# Patient Record
(demographics unavailable — no encounter records)

---

## 2024-10-07 NOTE — DATA REVIEWED
[FreeTextEntry1] : Labs reviewed 4/24 GUERO 1:1280 speckled DsDNA 12 RF>8, smith>8, SSA.8 plt 132 ESR 46

## 2024-10-07 NOTE — HISTORY OF PRESENT ILLNESS
[___ Month(s) Ago] : [unfilled] month(s) ago [FreeTextEntry1] : =pt plaquenil 200mg BID  =pt says her pain has completely resolved =pt had fall on R hand, and has some pain there =has not seen optho  =pt has to do PAP smear and colonoscopy; has not done mammography No fevers, rashes, swollen glands, CP, SOB, cough, abdominal pain, n/v/d, focal weakness, sensory loss

## 2024-10-07 NOTE — ASSESSMENT
[FreeTextEntry1] : 53 y/o F w polyarthralgias, polymyalgias =pain prominent in DIPs, PIPs, MCPs, wrists, toes =pain in other joints, mm, less frequent =worse w activities =past use of plaquenil =labs 4/24 GUERO 1:1280 speckled, DsDNA 12, RF>8, smith>8, SSA>8, centromere 15, ESR 46, plt 132  Pt responded to plaquenil. Will continue. Counseled to see optho for retinal toxicity screening.   Plan -Labs w serologies, inflammatory markers -plaquenil 200mg BID  RTO in 4 months

## 2024-10-07 NOTE — PHYSICAL EXAM
[General Appearance - In No Acute Distress] : in no acute distress [General Appearance - Well Nourished] : well nourished [General Appearance - Well Developed] : well developed [FreeTextEntry1] : obese [Sclera] : the sclera and conjunctiva were normal [Auscultation Breath Sounds / Voice Sounds] : lungs were clear to auscultation bilaterally [Heart Rate And Rhythm] : heart rate was normal and rhythm regular [Heart Sounds] : normal S1 and S2 [Murmurs] : no murmurs [Abdomen Soft] : soft [Abdomen Tenderness] : non-tender [Cervical Lymph Nodes Enlarged Posterior Bilaterally] : posterior cervical [Cervical Lymph Nodes Enlarged Anterior Bilaterally] : anterior cervical [Supraclavicular Lymph Nodes Enlarged Bilaterally] : supraclavicular [Musculoskeletal - Swelling] : no joint swelling seen [] : no rash [Skin Lesions] : no skin lesions [Oriented To Time, Place, And Person] : oriented to person, place, and time

## 2025-02-10 NOTE — ASSESSMENT
[FreeTextEntry1] : 52 y/o F w polyarthralgias, polymyalgias =pain prominent in DIPs, PIPs, MCPs, wrists, toes =pain in other joints, mm, less frequent =worse w activities =past use of plaquenil =labs 4/24 GUERO 1:1280 speckled, DsDNA 12, RF>8, smith>8, SSA>8, centromere 15, ESR 46, plt 132  Pt w occasional hand and foot pain. Did have high ESR, and DsDNA in last visit. can consider adding MTX?   Counseled pt needs to see optho regulary to avoid plaquenil retino-toxicity.  Plan -Labs w serologies, inflammatory markers- add MTX?  -lower hcq to patient's weight; plaquenil 200mg/400mg alternating days -optho referral -hand PT, PT  RTO in 3-4 months (earlier if add MTX)

## 2025-02-10 NOTE — HISTORY OF PRESENT ILLNESS
[___ Month(s) Ago] : [unfilled] month(s) ago [FreeTextEntry1] : =pt taking plaquenil 200mg BID; pt has not gone to optho  =pt feels well, has some mild pain in hands, feet; occasionally and mild  =no fevers, SOB, CP, abdominal pain, n/v/d, rashes

## 2025-05-13 NOTE — HISTORY OF PRESENT ILLNESS
[___ Month(s) Ago] : [unfilled] month(s) ago [FreeTextEntry1] : =pt hcq 200mg/400mg every other day =pt saw optho  in 4/25  =pt says she has some mild pain in hands and feet =No fevers, rashes, swollen glands, CP, SOB, cough, abdominal pain, n/v/d, focal weakness, sensory loss, Raynaud's

## 2025-05-13 NOTE — PHYSICAL EXAM
[General Appearance - In No Acute Distress] : in no acute distress [General Appearance - Well Nourished] : well nourished [General Appearance - Well Developed] : well developed [FreeTextEntry1] : obese [Sclera] : the sclera and conjunctiva were normal [Auscultation Breath Sounds / Voice Sounds] : lungs were clear to auscultation bilaterally [Heart Rate And Rhythm] : heart rate was normal and rhythm regular [Heart Sounds] : normal S1 and S2 [Murmurs] : no murmurs [Abdomen Soft] : soft [Abdomen Tenderness] : non-tender [Cervical Lymph Nodes Enlarged Anterior Bilaterally] : anterior cervical [Cervical Lymph Nodes Enlarged Posterior Bilaterally] : posterior cervical [Supraclavicular Lymph Nodes Enlarged Bilaterally] : supraclavicular [Musculoskeletal - Swelling] : no joint swelling seen [] : no rash [Skin Lesions] : no skin lesions [Oriented To Time, Place, And Person] : oriented to person, place, and time

## 2025-05-13 NOTE — ASSESSMENT
[FreeTextEntry1] : 52 y/o F w polyarthralgias, polymyalgias =pain prominent in DIPs, PIPs, MCPs, wrists, toes =pain in other joints, mm, less frequent =worse w activities =past use of plaquenil =labs 4/24 GUERO 1:1280 speckled, DsDNA 12, RF>8, smith>8, SSA>8, centromere 15, ESR 46, plt 132  mild dz activity? Pt says her symptoms are not severe to escalate to immunosuppression.   Plan -Labs w serologies, inflammatory markers- add MTX?  -plaquenil 200mg/400mg alternating days RTO in 3-4 months